# Patient Record
Sex: MALE | Race: WHITE | NOT HISPANIC OR LATINO | URBAN - METROPOLITAN AREA
[De-identification: names, ages, dates, MRNs, and addresses within clinical notes are randomized per-mention and may not be internally consistent; named-entity substitution may affect disease eponyms.]

---

## 2024-07-11 ENCOUNTER — APPOINTMENT (OUTPATIENT)
Dept: DERMATOLOGY | Facility: CLINIC | Age: 84
End: 2024-07-11
Payer: MEDICARE

## 2024-07-11 VITALS — DIASTOLIC BLOOD PRESSURE: 78 MMHG | SYSTOLIC BLOOD PRESSURE: 121 MMHG | HEART RATE: 77 BPM

## 2024-07-11 DIAGNOSIS — C44.42 SQUAMOUS CELL CARCINOMA OF SKIN OF SCALP AND NECK: Primary | ICD-10-CM

## 2024-07-11 PROCEDURE — 99204 OFFICE O/P NEW MOD 45 MIN: CPT | Performed by: DERMATOLOGY

## 2024-07-11 PROCEDURE — 17311 MOHS 1 STAGE H/N/HF/G: CPT | Performed by: DERMATOLOGY

## 2024-07-11 NOTE — PROGRESS NOTES
"Office Visit Note  Date: 7/11/2024  Surgeon:  Jarrell Duenas MD  Office Location: [address]   [facility] [address]  [facility] [address]  Dept: [phone]  Dept Fax: [phone]  Referring Provider: Jarrell Duenas MD  [address] [place] [address]   Evelyn Guardado [place], [facility] 125  [place] [id number]    Subjective   Jagdish Karl \"AURORA\" is a 83 y.o. male who presents for the following: MOHS Surgery    According to the patient, the lesion has been present for approximately 6 months at the time of diagnosis.  The lesion is not causing symptoms.  The lesion has not been treated previously.    The patient does not have a pacemaker / defibrillator.  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    The patient is on blood thinners.  The patient does not have a history of hepatitis B or C.  The patient does not have a history of HIV.  The patient does not have a history of immunosuppression (e.g. organ transplantation, malignancy, medications)    Review of Systems:  No other skin or systemic complaints other than what is documented elsewhere in the note.    MEDICAL HISTORY: clinically relevant history including significant past medical history, medications and allergies was reviewed and documented in Epic.    Objective   Well appearing patient in no apparent distress; mood and affect are within normal limits.  Vital signs: See record.  Noted on the Left Central Frontal Scalp  Is a 0.9 x 0.9 cm scar        The patient confirmed the identified site.    Discussion:  The nature of the diagnosis was explained. The lesion is a skin cancer.  It has a risk of local growth and distant spread. The condition is associated with sun exposure.  Warning signs of non-melanoma skin cancer discussed. Patient was instructed to perform monthly self skin examination.  We recommended that the patient have regular full skin exams given an increased risk of subsequent skin cancers. The patient was " instructed to use sun protective behaviors including use of broad spectrum sunscreens and sun protective clothing to reduce risk of skin cancers.      Risks, benefits, side effects of Mohs surgery were discussed with patient and the patient voiced understanding.  It was explained that even though the cure rate of Mohs is very high it is not 100%. Risks of surgery including but not limited to bleeding, infection, numbness, nerve damage, and scar were reviewed.  Discussion included wound care requirements, activity restrictions, likely scar outcome and time to heal.     After Mohs surgery, the defect may need to be repaired surgically and the scar may be longer than the original lesion.  Reconstruction options, risks, and benefits were reviewed including second intention healing, linear repair (4-1 ratio was explained), local flaps, skin grafts, cartilage grafts and interpolation flaps (the need for multiple surgeries was explained). Possible outcomes were reviewed including likely scar appearance, failure of flap survival, infection, bleeding and the need for revision surgery.     The pathology was reviewed, the photograph was reviewed, and the referring physician's note was reviewed.    Patient elected for Mohs surgery.    The patient has a squamous cell carcinoma.  The pathology was reviewed, the photograph was reviewed, and the referring physicians note was reviewed.  Multiple treatment options including mohs surgery (which has moderate risk of morbidity) were reviewed.    Medical Decision Making  Column 1- Squamous Cell Carcinoma (1 acute uncomplicated illness- Low)  Column 2- 3 tests reviewed (pathology, photograph, referring physician notes- Moderate)  Column 3- Modertate risk of morbidity from additional treatment- mohs surgry- Moderate)    Overall Moderate MDM

## 2024-07-11 NOTE — LETTER
MOH's Provider/Referral Letter Treatment Plan    Patient: Jagdish Barajas   YOB: 1940   Date of Visit: 7/11/2024   MRN: [id number]     Enrique Linares  [address] [facility]  [address]  [place] [id number]    Dear Enrique Linares,      I had the pleasure of seeing Jagdish Barajas today in consultation at your request for evaluation and treatment of:  1. 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  After informed consent was obtained, the patient underwent the procedure without complication.    The skin cancer was removed, wound care instructions were given and the patient was advised to follow up with you.  I will see the patient post-operatively as indicated.    Thank you very much for your confidence in me and for allowing me to share in the care of this patient.    1. 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  Left Central Frontal Scalp  Is a 0.9 x 0.9 cm scar    Mohs surgery    Consent obtained: written    Universal Protocol:  Procedure explained and questions answered to patient or proxy's satisfaction: Yes    Test results available and properly labeled: Yes    Pathology report reviewed: Yes    External notes reviewed: Yes    Photo or diagram used for site identification: Yes    Site/side marked: Yes    Slide independently reviewed by Mohs surgeon: Yes    Immediately prior to procedure a time out was called: Yes    Patient identity confirmed: verbally with patient  Preparation: Patient was prepped and draped in usual sterile fashion      Anticoagulation:  Is the patient taking prescription anticoagulant and/or aspirin prescribed/recommended by a physician? Yes    Was the anticoagulation regimen  changed prior to Mohs? No      Anesthesia:  Anesthesia method: local infiltration  Local anesthetic: lidocaine 1% WITH epi    Procedure Details:  Biopsy accession number: [id number]  Date of biopsy: 5/2/2024  Pre-Op diagnosis: squamous cell carcinoma  SCC subtype: well Differentiated.  Surgery side: left  Surgical site (from skin exam): Left Central Frontal Scalp  Pre-operative length (cm): 0.9  Pre-operative width (cm): 0.9  Indications for Mohs surgery: anatomic location where tissue conservation is critical  Previously treated? No      Micrographic Surgery Details:  Post-operative length (cm): 1  Post-operative width (cm): 1  Number of Mohs stages: 1    Stage 1     Comments: The patient was brought into the operating room and placed in the procedure chair in the appropriate position.  The area positive by previous biopsy was identified and confirmed with the patient. The area of clinically obvious tumor was debulked using a curette and/or scalpel as needed. An incision was made following the Mohs approach through the skin. The specimen was taken to the lab, divided into 2 piece(s) and appropriately chromacoded and processed.             Tumor features identified on Mohs section: no tumor identified    Depth of defect: subcutaneous fat    Patient tolerance of procedure: tolerated well, no immediate complications    Reconstruction:  Was the defect reconstructed?: No    Fine/surface layer approximation (top stitches)   Hemostasis achieved with: electrodesiccation  Outcome: patient tolerated procedure well with no complications    Post-procedure details: sterile dressing applied and wound care instructions given    Dressing type: Gelfoam, Telfa pad and pressure dressing    Additional details:  Repair: After a discussion with the patient regarding the options for wound closure, a decision was made to proceed with second intention healing.  Dressing F/U: Surgifoam was placed in the wound. A pressure dressing was placed  to help stabilize the wound and to minimize the risk of postoperative bleeding. Wound care was discussed, and the patient was given written post-operative wound care instructions.       Staff Communication: Dermatology Local Anesthesia: Site Location: Left Central Frontal Scalp 1 % Lidocaine / Epinephrine - Amount: 3 CC           Sincerely,       Jarrell Duenas MD  [facility]

## 2024-07-11 NOTE — PROGRESS NOTES
Mohs Surgery Operative Note    Date of Surgery:  7/11/2024  Surgeon:  Jarrell Duenas MD  Office Location: [address]  [facility] [address]  Dept: [phone]  Dept Fax: [phone]  Referring Provider: Jarrell Duenas MD  [address] [place] [address]   Evelyn Debby [place], [address] [address]      Assessment/Plan   Pre-procedure:   Obtained informed consent: written from patient  The surgical site was identified and confirmed with the patient.     Intra-operative:   Audible time out called at : 2:12 PM 07/11/24  by: Steffanie Jewell MA   Verified patient name, birthdate, site, specimen bottle label & requisition.    The planned procedure(s) was again reviewed with the patient. The risks of bleeding, infection, nerve damage and scarring were reviewed. Written authorization was obtained. The patient identity, surgical site, and planned procedure(s) were verified. The provider acted as both surgeon and pathologist.     Squamous cell carcinoma of skin  Left Central Frontal Scalp    Mohs surgery    Consent obtained: written    Universal Protocol:  Procedure explained and questions answered to patient or proxy's satisfaction: Yes    Test results available and properly labeled: Yes    Pathology report reviewed: Yes    External notes reviewed: Yes    Photo or diagram used for site identification: Yes    Site/side marked: Yes    Slide independently reviewed by Mohs surgeon: Yes    Immediately prior to procedure a time out was called: Yes    Patient identity confirmed: verbally with patient  Preparation: Patient was prepped and draped in usual sterile fashion      Anticoagulation:  Is the patient taking prescription anticoagulant and/or aspirin prescribed/recommended by a physician? Yes    Was the anticoagulation regimen changed prior to Mohs? No      Anesthesia:  Anesthesia method: local infiltration  Local anesthetic: lidocaine 1% WITH epi    Procedure  Details:  Biopsy accession number: [id number]  Date of biopsy: 5/2/2024  Pre-Op diagnosis: squamous cell carcinoma  SCC subtype: well Differentiated.  Surgery side: left  Surgical site (from skin exam): Left Central Frontal Scalp  Pre-operative length (cm): 0.9  Pre-operative width (cm): 0.9  Indications for Mohs surgery: anatomic location where tissue conservation is critical  Previously treated? No      Micrographic Surgery Details:  Post-operative length (cm): 1  Post-operative width (cm): 1  Number of Mohs stages: 1    Stage 1     Comments: The patient was brought into the operating room and placed in the procedure chair in the appropriate position.  The area positive by previous biopsy was identified and confirmed with the patient. The area of clinically obvious tumor was debulked using a curette and/or scalpel as needed. An incision was made following the Mohs approach through the skin. The specimen was taken to the lab, divided into 2 piece(s) and appropriately chromacoded and processed.             Tumor features identified on Mohs section: no tumor identified    Depth of defect: subcutaneous fat    Patient tolerance of procedure: tolerated well, no immediate complications    Reconstruction:  Was the defect reconstructed?: No    Fine/surface layer approximation (top stitches)   Hemostasis achieved with: electrodesiccation  Outcome: patient tolerated procedure well with no complications    Post-procedure details: sterile dressing applied and wound care instructions given    Dressing type: Gelfoam, Telfa pad and pressure dressing    Additional details:  Repair: After a discussion with the patient regarding the options for wound closure, a decision was made to proceed with second intention healing.  Dressing F/U: Surgifoam was placed in the wound. A pressure dressing was placed to help stabilize the wound and to minimize the risk of postoperative bleeding. Wound care was discussed, and the patient was given  written post-operative wound care instructions.       The final repair measured 1.0 x 1.0 cm          Wound care was discussed, and the patient was given written post-operative wound care instructions.      The patient will follow up with Jarrell Duneas MD as needed for any post operative problems or concerns, and will follow up with their primary dermatologist as scheduled.

## 2024-07-18 ENCOUNTER — APPOINTMENT (OUTPATIENT)
Dept: DERMATOLOGY | Facility: CLINIC | Age: 84
End: 2024-07-18
Payer: MEDICARE

## 2024-07-18 VITALS — SYSTOLIC BLOOD PRESSURE: 111 MMHG | DIASTOLIC BLOOD PRESSURE: 70 MMHG | HEART RATE: 76 BPM

## 2024-07-18 DIAGNOSIS — C44.329 SQUAMOUS CELL CARCINOMA OF SKIN OF LEFT CHEEK: ICD-10-CM

## 2024-07-18 RX ORDER — EZETIMIBE 10 MG/1
1 TABLET ORAL
COMMUNITY
Start: 2024-04-19

## 2024-07-18 RX ORDER — TIRZEPATIDE 7.5 MG/.5ML
INJECTION, SOLUTION SUBCUTANEOUS
COMMUNITY
Start: 2024-05-06

## 2024-07-18 RX ORDER — NIRMATRELVIR AND RITONAVIR 300-100 MG
3 KIT ORAL
COMMUNITY
Start: 2023-09-13

## 2024-07-18 RX ORDER — ONDANSETRON 4 MG/1
TABLET, ORALLY DISINTEGRATING ORAL
COMMUNITY
Start: 2024-05-18

## 2024-07-18 RX ORDER — ROSUVASTATIN CALCIUM 20 MG/1
1 TABLET, COATED ORAL
COMMUNITY
Start: 2024-04-21

## 2024-07-18 RX ORDER — PANTOPRAZOLE SODIUM 40 MG/1
1 TABLET, DELAYED RELEASE ORAL
COMMUNITY
Start: 2024-05-12

## 2024-07-18 NOTE — PROGRESS NOTES
"Office Visit Note  Date: 7/18/2024  Surgeon:  Jarrell Duenas MD  Office Location: [address] SHAFER Phillip  [facility] [address]  Dept: [phone]  Dept Fax: [phone]  Referring Provider: No referring provider defined for this encounter.    Opal Barajas \"AURORA\" is a 83 y.o. male who presents for the following: MOHS Surgery    According to the patient, the lesion has been present for approximately 6 months at the time of diagnosis.  The lesion is itchy and painful.  The lesion has not been treated previously.    The patient does not have a pacemaker / defibrillator.  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    The patient is not on blood thinners.  The patient does not have a history of hepatitis B or C.  The patient does not have a history of HIV.  The patient does not have a history of immunosuppression (e.g. organ transplantation, malignancy, medications)    Review of Systems:  No other skin or systemic complaints other than what is documented elsewhere in the note.    MEDICAL HISTORY: clinically relevant history including significant past medical history, medications and allergies was reviewed and documented in Epic.    Objective   Well appearing patient in no apparent distress; mood and affect are within normal limits.  Vital signs: See record.  Noted on the Left Superior Lateral Malar Cheek  Is a 2.1 x 1.8 cm scar        The patient confirmed the identified site.    Discussion:  The nature of the diagnosis was explained. The lesion is a skin cancer.  It has a risk of local growth and distant spread. The condition is associated with sun exposure.  Warning signs of non-melanoma skin cancer discussed. Patient was instructed to perform monthly self skin examination.  We recommended that the patient have regular full skin exams given an increased risk of subsequent skin cancers. The patient was instructed to use sun protective behaviors " including use of broad spectrum sunscreens and sun protective clothing to reduce risk of skin cancers.      Risks, benefits, side effects of Mohs surgery were discussed with patient and the patient voiced understanding.  It was explained that even though the cure rate of Mohs is very high it is not 100%. Risks of surgery including but not limited to bleeding, infection, numbness, nerve damage, and scar were reviewed.  Discussion included wound care requirements, activity restrictions, likely scar outcome and time to heal.     After Mohs surgery, the defect may need to be repaired surgically and the scar may be longer than the original lesion.  Reconstruction options, risks, and benefits were reviewed including second intention healing, linear repair (4-1 ratio was explained), local flaps, skin grafts, cartilage grafts and interpolation flaps (the need for multiple surgeries was explained). Possible outcomes were reviewed including likely scar appearance, failure of flap survival, infection, bleeding and the need for revision surgery.     The pathology was reviewed, the photograph was reviewed, and the referring physician's note was reviewed.    Patient elected for Mohs surgery.    The patient has a squamous cell carcinoma.  The pathology was reviewed, the photograph was reviewed, and the referring physicians note was reviewed.  Multiple treatment options including mohs surgery (which has moderate risk of morbidity) were reviewed.    Medical Decision Making  Column 1- Squamous Cell Carcinoma (1 acute uncomplicated illness- Low)  Column 2- 3 tests reviewed (pathology, photograph, referring physician notes- Moderate)  Column 3- Modertate risk of morbidity from additional treatment- mohs surgry- Moderate)    Overall Moderate MDM

## 2024-07-18 NOTE — LETTER
MOH's Provider/Referral Letter Treatment Plan    Patient: Jagdish Barajas   YOB: 1940   Date of Visit: 7/18/2024   MRN: [id number]     Enrique Linares  [address] [facility] [address]  [place] [address]    Dear Enrique Linares,    I had the pleasure of seeing Jagdish Barajas today in consultation at your request for evaluation and treatment of:  1. 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  After informed consent was obtained, the patient underwent the procedure without complication.    The skin cancer was removed, wound care instructions were given and the patient was advised to follow up with you.  I will see the patient post-operatively as indicated.    Thank you very much for your confidence in me and for allowing me to share in the care of this patient.    1. 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  Left Superior Lateral Malar Cheek  Is a 2.1 x 1.8 cm scar    Mohs surgery    Consent obtained: written    Universal Protocol:  Procedure explained and questions answered to patient or proxy's satisfaction: Yes    Test results available and properly labeled: Yes    Pathology report reviewed: Yes    External notes reviewed: Yes    Photo or diagram used for site identification: Yes    Site/side marked: Yes    Slide independently reviewed by Mohs surgeon: Yes    Immediately prior to procedure a time out was called: Yes    Patient identity confirmed: verbally with patient  Preparation: Patient was prepped and draped in usual sterile fashion      Anticoagulation:  Is the patient taking prescription anticoagulant and/or aspirin prescribed/recommended by a physician? No    Was the  anticoagulation regimen changed prior to Mohs? No      Anesthesia:  Anesthesia method: local infiltration  Local anesthetic: lidocaine 1% WITH epi    Procedure Details:  Biopsy accession number: [id number]  Date of biopsy: 5/2/2024  Pre-Op diagnosis: squamous cell carcinoma  Surgery side: left  Surgical site (from skin exam): Left Superior Lateral Malar Cheek  Pre-operative length (cm): 2.1  Pre-operative width (cm): 1.8  Indications for Mohs surgery: tumor size greater than 2 cm  Previously treated? No      Micrographic Surgery Details:  Post-operative length (cm): 2.4  Post-operative width (cm): 2.1  Number of Mohs stages: 1    Stage 1     Comments: The patient was brought into the operating room and placed in the procedure chair in the appropriate position.  The area positive by previous biopsy was identified and confirmed with the patient. The area of clinically obvious tumor was debulked using a curette and/or scalpel as needed. An incision was made following the Mohs approach through the skin. The specimen was taken to the lab, divided into 2 piece(s) and appropriately chromacoded and processed.       Tumor features identified on Mohs section: no tumor identified    Depth of defect: subcutaneous fat    Patient tolerance of procedure: tolerated well, no immediate complications    Reconstruction:  Was the defect reconstructed? Yes    Was reconstruction performed by the same Mohs surgeon? Yes    Setting of reconstruction: outpatient office  When was reconstruction performed? same day  Type of reconstruction: linear  Linear reconstruction: complex  Length of linear repair (cm): 6.6  Subcutaneous Layers (Deep Stitches)   Suture size:  4-0  Suture type:  Vicryl  Stitches:  Buried vertical mattress  Fine/surface layer approximation (top stitches)   Epidermal/Superficial suture size:  5-0  Epidermal/Superficial suture type:  Fast-absorbing gut  Stitches: simple running    Hemostasis achieved with:  electrodesiccation  Outcome: patient tolerated procedure well with no complications    Post-procedure details: sterile dressing applied and wound care instructions given    Dressing type: pressure dressing      Staff Communication: Dermatology Local Anesthesia: Site Location: Left Superior Lateral Malar Cheek 1 % Lidocaine / Epinephrine - Amount: 9.8 cc           Sincerely,       Jarrell Duenas MD  [facility]

## 2024-07-18 NOTE — PROGRESS NOTES
Mohs Surgery Operative Note    Date of Surgery:  7/18/2024  Surgeon:  Jarrell uDenas MD  Office Location: [address]   [place]  [facility] [address]  Dept: [phone]  Dept Fax: [phone]  Referring Provider: No referring provider defined for this encounter.      Assessment/Plan   Pre-procedure:   Obtained informed consent: written from patient  The surgical site was identified and confirmed with the patient.     Intra-operative:   Audible time out called at : 12:11 PM 07/18/24  by: Tory Cuevas MA   Verified patient name, birthdate, site, specimen bottle label & requisition.    The planned procedure(s) was again reviewed with the patient. The risks of bleeding, infection, nerve damage and scarring were reviewed. Written authorization was obtained. The patient identity, surgical site, and planned procedure(s) were verified. The provider acted as both surgeon and pathologist.     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  Left Superior Lateral Malar Cheek    Mohs surgery    Consent obtained: written    Universal Protocol:  Procedure explained and questions answered to patient or proxy's satisfaction: Yes    Test results available and properly labeled: Yes    Pathology report reviewed: Yes    External notes reviewed: Yes    Photo or diagram used for site identification: Yes    Site/side marked: Yes    Slide independently reviewed by Mohs surgeon: Yes    Immediately prior to procedure a time out was called: Yes    Patient identity confirmed: verbally with patient  Preparation: Patient was prepped and draped in usual sterile fashion      Anticoagulation:  Is the patient taking prescription anticoagulant and/or aspirin prescribed/recommended by a physician? No    Was the anticoagulation regimen changed prior to Mohs? No      Anesthesia:  Anesthesia method: local infiltration  Local anesthetic: lidocaine 1% WITH epi    Procedure Details:  Biopsy accession number:  [id number]  Date of biopsy: 5/2/2024  Pre-Op diagnosis: squamous cell carcinoma  Surgery side: left  Surgical site (from skin exam): Left Superior Lateral Malar Cheek  Pre-operative length (cm): 2.1  Pre-operative width (cm): 1.8  Indications for Mohs surgery: tumor size greater than 2 cm  Previously treated? No      Micrographic Surgery Details:  Post-operative length (cm): 2.4  Post-operative width (cm): 2.1  Number of Mohs stages: 1    Stage 1     Comments: The patient was brought into the operating room and placed in the procedure chair in the appropriate position.  The area positive by previous biopsy was identified and confirmed with the patient. The area of clinically obvious tumor was debulked using a curette and/or scalpel as needed. An incision was made following the Mohs approach through the skin. The specimen was taken to the lab, divided into 2 piece(s) and appropriately chromacoded and processed.       Tumor features identified on Mohs section: no tumor identified    Depth of defect: subcutaneous fat    Patient tolerance of procedure: tolerated well, no immediate complications    Reconstruction:  Was the defect reconstructed? Yes    Was reconstruction performed by the same Mohs surgeon? Yes    Setting of reconstruction: outpatient office  When was reconstruction performed? same day  Type of reconstruction: linear  Linear reconstruction: complex  Length of linear repair (cm): 6.6  Subcutaneous Layers (Deep Stitches)   Suture size:  4-0  Suture type:  Vicryl  Stitches:  Buried vertical mattress  Fine/surface layer approximation (top stitches)   Epidermal/Superficial suture size:  5-0  Epidermal/Superficial suture type:  Fast-absorbing gut  Stitches: simple running    Hemostasis achieved with: electrodesiccation  Outcome: patient tolerated procedure well with no complications    Post-procedure details: sterile dressing applied and wound care instructions given    Dressing type: pressure dressing       Complex Linear Repair - Wide Undermining:  Given the location and size of the defect, it was determined that a complex layered linear closure was required to restore normal anatomy and function. The repair was considered complex because extensive undermining was required and performed. The amount of undermining performed was greater than the maximum width of the defect as measured perpendicular to the closure line along at least one entire edge of the defect. Standing cutaneous cones were removed using Burow's triangles. The wound edges were brought into close approximation with buried vertical mattress sutures. The remainder of the wound was then closed with epidermal top sutures.    The final repair measured 6.6 cm                Wound care was discussed, and the patient was given written post-operative wound care instructions.      The patient will follow up with Jarrell Duenas MD as needed for any post operative problems or concerns, and will follow up with their primary dermatologist as scheduled.

## 2024-07-25 ENCOUNTER — APPOINTMENT (OUTPATIENT)
Dept: DERMATOLOGY | Facility: CLINIC | Age: 84
End: 2024-07-25
Payer: MEDICARE

## 2024-07-25 VITALS — DIASTOLIC BLOOD PRESSURE: 81 MMHG | HEART RATE: 71 BPM | SYSTOLIC BLOOD PRESSURE: 127 MMHG

## 2024-07-25 DIAGNOSIS — C44.311 BASAL CELL CARCINOMA (BCC) OF SKIN OF NOSE: ICD-10-CM

## 2024-07-25 PROCEDURE — 17312 MOHS ADDL STAGE: CPT | Performed by: DERMATOLOGY

## 2024-07-25 PROCEDURE — 14061 TIS TRNFR E/N/E/L10.1-30SQCM: CPT | Performed by: DERMATOLOGY

## 2024-07-25 PROCEDURE — 99024 POSTOP FOLLOW-UP VISIT: CPT | Performed by: DERMATOLOGY

## 2024-07-25 PROCEDURE — 17311 MOHS 1 STAGE H/N/HF/G: CPT | Performed by: DERMATOLOGY

## 2024-07-25 NOTE — PROGRESS NOTES
Mohs Surgery Operative Note    Date of Surgery:  7/25/2024  Surgeon:  Jarrell Duenas MD  Office Location: [address]   [place]  [facility] [address]  Dept: [phone]  Dept Fax: [phone]  Referring Provider: No referring provider defined for this encounter.      Assessment/Plan   Pre-procedure:   Obtained informed consent: written from patient  The surgical site was identified and confirmed with the patient.     Intra-operative:   Audible time out called at : 10:30 AM 07/25/24  by: Tory Cuevas MA   Verified patient name, birthdate, site, specimen bottle label & requisition.    The planned procedure(s) was again reviewed with the patient. The risks of bleeding, infection, nerve damage and scarring were reviewed. Written authorization was obtained. The patient identity, surgical site, and planned procedure(s) were verified. The provider acted as both surgeon and pathologist.     Basal cell carcinoma (BCC) of skin of nose  Right Nasal Dorsum    Mohs surgery    Consent obtained: written    Universal Protocol:  Procedure explained and questions answered to patient or proxy's satisfaction: Yes    Test results available and properly labeled: Yes    Pathology report reviewed: Yes    External notes reviewed: Yes    Photo or diagram used for site identification: Yes    Site/side marked: Yes    Slide independently reviewed by Mohs surgeon: Yes    Immediately prior to procedure a time out was called: Yes    Patient identity confirmed: verbally with patient  Preparation: Patient was prepped and draped in usual sterile fashion      Anticoagulation:  Is the patient taking prescription anticoagulant and/or aspirin prescribed/recommended by a physician? No    Was the anticoagulation regimen changed prior to Mohs? No      Anesthesia:  Anesthesia method: local infiltration  Local anesthetic: lidocaine 1% WITH epi    Procedure Details:  Biopsy accession number: [id number] Outside  Path  Date of biopsy: 5/2/2024  Pre-Op diagnosis: basal cell carcinoma  BCC subtype: micronodular and nodular  Surgery side: right  Surgical site (from skin exam): Right Nasal Dorsum  Pre-operative length (cm): 1.5  Pre-operative width (cm): 1.3  Indications for Mohs surgery: anatomic location where tissue conservation is critical  Previously treated? No      Micrographic Surgery Details:  Post-operative length (cm): 2.5  Post-operative width (cm): 2  Number of Mohs stages: 3    Stage 1     Comments: The area of positivity as noted on the Mohs map in the previous stage was identified and removed using the Mohs technique. The specimen was taken to the lab and appropriately chromacoded and processed in 2 piece(s).    Tumor was seen on both the lateral and deep margins as indicated on the on the Mohs map.  Infiltrative basal cell carcinoma. Histologic examination revealed thin strands and small-angulated aggregates of atypical basaloid cells.     Tumor features identified on Mohs section: basal carcinoma    Depth of invasion: dermis    Stage 2     Comments: The patient was brought into the operating room and placed in the procedure chair in the appropriate position.  The area positive by previous biopsy was identified and confirmed with the patient. The area of clinically obvious tumor was debulked using a curette and/or scalpel as needed. An incision was made following the Mohs approach through the skin. The specimen was taken to the lab, divided into 2 piece(s) and appropriately chromacoded and processedTumor was seen on the lateral margins as indicated on the on the Mohs map.  Infiltrative basal cell carcinoma. Histologic examination revealed thin strands and small-angulated aggregates of atypical basaloid cells.    Tumor features identified on Mohs section: basal carcinoma   Depth of invasion: dermis    Stage 3     Comments: The area of positivity as noted on the Mohs map in the previous stage was identified and  removed using the Mohs technique. The specimen was taken to the lab and appropriately chromacoded and processed in 1 piece(s).     Tumor features identified on Mohs section: no tumor identified    Depth of defect: subcutaneous fat    Patient tolerance of procedure: tolerated well, no immediate complications    Reconstruction:  Was the defect reconstructed? Yes    Was reconstruction performed by the same Mohs surgeon? Yes    Setting of reconstruction: outpatient office  When was reconstruction performed? same day  Type of reconstruction: flap  Type of flap: advancement    Advancement flap type: unilateral single arm  Flap area (cm2): 21  Subcutaneous Layers (Deep Stitches)   Suture size:  4-0  Suture type:  Vicryl  Stitches:  Buried vertical mattress  Fine/surface layer approximation (top stitches)   Epidermal/Superficial suture size:  5-0  Epidermal/Superficial suture type:  Fast-absorbing gut  Stitches: simple running    Hemostasis achieved with: electrodesiccation  Outcome: patient tolerated procedure well with no complications    Post-procedure details: sterile dressing applied and wound care instructions given    Dressing type: pressure dressing      Advancement Flap:  Due to geometric and functional constraints, a flap reconstruction was performed to reconstruct the defect. To that end, adjacent tissue was incised and carried over to close the defect in the following manner: Advancement flap Using skin marking ink, an advancement flap was designed to repair the defect and minimize functional and cosmetic distortion. Given the size, depth, and location of the defect, the surrounding structures and local tissue laxity, it was felt that an advancement flap was necessary to provide the best restoration of normal anatomy and function of the skin. The risks, benefits and likely outcome of the flap were discussed. The wound edges were refreshed to a 90 degree angle. The flap was cut and undermined extensively at the  level of the subcutaneous plane. Standing cutaneous cones were removed using Burow's triangles. The deep tissue was elevated and the flap was advanced into position to close the primary defect using multiple key deep sutures. The remainder of the flap was then affixed with epidermal sutures. The flap measured 21 cm2.       The final repair measured 21 cm^2    Wound care was discussed, and the patient was given written post-operative wound care instructions.                  The patient will follow up with Jarrell Duenas MD as needed for any post operative problems or concerns, and will follow up with their primary dermatologist as scheduled.

## 2024-07-25 NOTE — LETTER
MOH's Provider/Referral Letter Treatment Plan    Patient: Jagdish Barajas   YOB: 1940   Date of Visit: 7/25/2024   MRN: [id number]     Enrique Linares  [address]  [place] [id number]    Dear Enrique Linares,    I had the pleasure of seeing Jagdish Barajas today in consultation at your request for evaluation and treatment of:  1. Basal cell carcinoma (BCC) of skin of nose  Right Nasal Dorsum    Mohs surgery    Staff Communication: Dermatology Local Anesthesia: Site Location: Right Nasal Dorsum 1 % Lidocaine / Epinephrine - Amount: 27 ml      Mohs surgery was indicated because of the nature of the lesion and the need to obtain the highest cure rate.  After informed consent was obtained, the patient underwent the procedure without complication.    The skin cancer was removed, wound care instructions were given and the patient was advised to follow up with you.  I will see the patient post-operatively as indicated.    Thank you very much for your confidence in me and for allowing me to share in the care of this patient.    1. Basal cell carcinoma (BCC) of skin of nose  Right Nasal Dorsum  Is a 1.5 x 1.3 cm scar    Mohs surgery    Consent obtained: written    Universal Protocol:  Procedure explained and questions answered to patient or proxy's satisfaction: Yes    Test results available and properly labeled: Yes    Pathology report reviewed: Yes    External notes reviewed: Yes    Photo or diagram used for site identification: Yes    Site/side marked: Yes    Slide independently reviewed by Mohs surgeon: Yes    Immediately prior to procedure a time out was called: Yes    Patient identity confirmed: verbally with patient  Preparation: Patient was prepped and draped in usual sterile fashion      Anticoagulation:  Is the patient taking prescription anticoagulant and/or aspirin prescribed/recommended by a physician? No    Was the anticoagulation regimen changed prior to Mohs? No       Anesthesia:  Anesthesia method: local infiltration  Local anesthetic: lidocaine 1% WITH epi    Procedure Details:  Biopsy accession number: [id number] Outside Path  Date of biopsy: 5/2/2024  Pre-Op diagnosis: basal cell carcinoma  BCC subtype: micronodular and nodular  Surgery side: right  Surgical site (from skin exam): Right Nasal Dorsum  Pre-operative length (cm): 1.5  Pre-operative width (cm): 1.3  Indications for Mohs surgery: anatomic location where tissue conservation is critical  Previously treated? No      Micrographic Surgery Details:  Post-operative length (cm): 2.5  Post-operative width (cm): 2  Number of Mohs stages: 3    Stage 1     Comments: The area of positivity as noted on the Mohs map in the previous stage was identified and removed using the Mohs technique. The specimen was taken to the lab and appropriately chromacoded and processed in 2 piece(s).    Tumor was seen on both the lateral and deep margins as indicated on the on the Mohs map.  Infiltrative basal cell carcinoma. Histologic examination revealed thin strands and small-angulated aggregates of atypical basaloid cells.               Tumor features identified on Mohs section: basal carcinoma      Depth of invasion: dermis    Stage 2     Comments: The patient was brought into the operating room and placed in the procedure chair in the appropriate position.  The area positive by previous biopsy was identified and confirmed with the patient. The area of clinically obvious tumor was debulked using a curette and/or scalpel as needed. An incision was made following the Mohs approach through the skin. The specimen was taken to the lab, divided into 2 piece(s) and appropriately chromacoded and processedTumor was seen on the lateral margins as indicated on the on the Mohs map.  Infiltrative basal cell carcinoma. Histologic examination revealed thin strands and small-angulated aggregates of atypical basaloid cells.         Tumor features  identified on Mohs section: basal carcinoma     Depth of invasion: dermis    Stage 3     Comments: The area of positivity as noted on the Mohs map in the previous stage was identified and removed using the Mohs technique. The specimen was taken to the lab and appropriately chromacoded and processed in 1 piece(s).     Tumor features identified on Mohs section: no tumor identified    Depth of defect: subcutaneous fat    Patient tolerance of procedure: tolerated well, no immediate complications    Reconstruction:  Was the defect reconstructed? Yes    Was reconstruction performed by the same Mohs surgeon? Yes    Setting of reconstruction: outpatient office  When was reconstruction performed? same day  Type of reconstruction: flap  Type of flap: advancement    Advancement flap type: unilateral single arm  Flap area (cm2): 21  Subcutaneous Layers (Deep Stitches)   Suture size:  4-0  Suture type:  Vicryl  Stitches:  Buried vertical mattress  Fine/surface layer approximation (top stitches)   Epidermal/Superficial suture size:  5-0  Epidermal/Superficial suture type:  Fast-absorbing gut  Stitches: simple running    Hemostasis achieved with: electrodesiccation  Outcome: patient tolerated procedure well with no complications    Post-procedure details: sterile dressing applied and wound care instructions given    Dressing type: pressure dressing      Staff Communication: Dermatology Local Anesthesia: Site Location: Right Nasal Dorsum 1 % Lidocaine / Epinephrine - Amount: 27 ml           Sincerely,       Jarrell Duenas MD  [facility]

## 2024-07-27 ENCOUNTER — TELEPHONE (OUTPATIENT)
Dept: DERMATOLOGY | Facility: HOSPITAL | Age: 84
End: 2024-07-27
Payer: MEDICARE

## 2024-07-27 NOTE — TELEPHONE ENCOUNTER
TELEPHONE ENCOUNTER  7/27/2024  Jagdish Barajas  [phone]    Reason for Call:  - Increased right-sided suad-orbital swelling on waking    Patient advised:  - Remain upright as much as possible today with ice over the site (such as 10min every hour per Post-Op Instructions)  - Patient denied worsening in erythema, bruising, or tenderness at the site; no subcutaneous nodules or rapidly expanding masses  - Advised swelling around the eyes is very common post-operatively, especially when surgical site is under the eye or on the nose (pt's site was right nasal dorsum)  - OK to perform bandage change today per Post-Op instructions    This case was discussed with Dr. Terrence Oliva.    Ling Parmar MD, SANDRA  PGY-3, Department of Dermatology

## 2024-09-05 ENCOUNTER — APPOINTMENT (OUTPATIENT)
Dept: DERMATOLOGY | Facility: CLINIC | Age: 84
End: 2024-09-05
Payer: MEDICARE

## 2024-09-05 DIAGNOSIS — Z51.89 VISIT FOR WOUND CHECK: ICD-10-CM

## 2024-09-05 PROCEDURE — 99024 POSTOP FOLLOW-UP VISIT: CPT | Performed by: DERMATOLOGY
